# Patient Record
Sex: MALE | Race: WHITE | NOT HISPANIC OR LATINO | URBAN - METROPOLITAN AREA
[De-identification: names, ages, dates, MRNs, and addresses within clinical notes are randomized per-mention and may not be internally consistent; named-entity substitution may affect disease eponyms.]

---

## 2021-03-16 ENCOUNTER — OFFICE VISIT (OUTPATIENT)
Dept: URGENT CARE | Facility: CLINIC | Age: 21
End: 2021-03-16
Payer: COMMERCIAL

## 2021-03-16 VITALS
WEIGHT: 120 LBS | HEIGHT: 65 IN | SYSTOLIC BLOOD PRESSURE: 98 MMHG | OXYGEN SATURATION: 98 % | BODY MASS INDEX: 19.99 KG/M2 | HEART RATE: 50 BPM | TEMPERATURE: 97.1 F | RESPIRATION RATE: 16 BRPM | DIASTOLIC BLOOD PRESSURE: 58 MMHG

## 2021-03-16 DIAGNOSIS — Z20.2 EXPOSURE TO SEXUALLY TRANSMITTED DISEASE (STD): ICD-10-CM

## 2021-03-16 DIAGNOSIS — B35.6 TINEA CRURIS: Primary | ICD-10-CM

## 2021-03-16 PROCEDURE — G0382 LEV 3 HOSP TYPE B ED VISIT: HCPCS | Performed by: FAMILY MEDICINE

## 2021-03-16 RX ORDER — CLOTRIMAZOLE 1 %
CREAM (GRAM) TOPICAL 2 TIMES DAILY
Qty: 30 G | Refills: 0 | Status: SHIPPED | OUTPATIENT
Start: 2021-03-16

## 2021-03-16 NOTE — PROGRESS NOTES
[facility]        NAME: Melissa Rodriguez is a 24 y o  male  : 2000    MRN: [id number]  DATE: 2021  TIME: 11:09 AM    Assessment and Plan   Tinea cruris [B35 6]  1  Tinea cruris  HIV 1/2 w/ Reflex (Multispot)    Chlamydia/GC amplified DNA by PCR    RPR    clotrimazole (LOTRIMIN) 1 % cream   2  Exposure to sexually transmitted disease (STD)  HIV 1/2 w/ Reflex (Multispot)    Chlamydia/GC amplified DNA by PCR    RPR    clotrimazole (LOTRIMIN) 1 % cream         Patient Instructions       Follow up with PCP in 3-5 days  Proceed to  ER if symptoms worsen  Chief Complaint     Chief Complaint   Patient presents with    Rash     Believes he has HPV and concerned it is in his mouth and genital area  Redness, lumps, feeling lightheaded  History of Present Illness       [de-identified] year male presenting for evaluation of rash in his groin, along the shaft of his penis and oral lesions  Patient states that his last sexual encounter was in 2020 at which time he was informed by his female partner at that time that she was positive for HPV  Denies any penile discharge or pain with urination  Denies any flank pain, body aches or muscle aches  Review of Systems   Review of Systems   Constitutional: Negative  HENT: Negative  Eyes: Negative  Respiratory: Negative  Cardiovascular: Negative  Gastrointestinal: Negative  Genitourinary: Positive for genital sores  Negative for discharge, dysuria, penile pain, scrotal swelling and testicular pain  Musculoskeletal: Negative  Negative for myalgias  Skin: Positive for rash  Allergic/Immunologic: Negative  Neurological: Negative  Hematological: Negative  Psychiatric/Behavioral: Negative            Current Medications       Current Outpatient Medications:     clotrimazole (LOTRIMIN) 1 % cream, Apply topically 2 (two) times a day, Disp: 30 g, Rfl: 0    Current Allergies     Allergies as of 2021 - Reviewed 03/16/2021   Allergen Reaction Noted    Other Other (See Comments) 03/16/2021            The following portions of the patient's history were reviewed and updated as appropriate: allergies, current medications, past family history, past medical history, past social history, past surgical history and problem list      No past medical history on file  No past surgical history on file  No family history on file  Medications have been verified  Objective   BP 98/58   Pulse (!) 50   Temp (!) 97 1 °F (36 2 °C)   Resp 16   Ht 5' 5" (1 651 m)   Wt 54 4 kg (120 lb)   SpO2 98%   BMI 19 97 kg/m²   No LMP for male patient  Physical Exam     Physical Exam  Constitutional:       Appearance: He is well-developed  HENT:      Head: Normocephalic  Eyes:      Pupils: Pupils are equal, round, and reactive to light  Neck:      Musculoskeletal: Normal range of motion  Pulmonary:      Effort: Pulmonary effort is normal    Musculoskeletal: Normal range of motion  Skin:     General: Skin is warm  Findings: Rash present  Rash is crusting and scaling  Neurological:      Mental Status: He is alert and oriented to person, place, and time